# Patient Record
Sex: MALE | Race: WHITE | Employment: FULL TIME | URBAN - METROPOLITAN AREA
[De-identification: names, ages, dates, MRNs, and addresses within clinical notes are randomized per-mention and may not be internally consistent; named-entity substitution may affect disease eponyms.]

---

## 2024-04-27 ENCOUNTER — OFFICE VISIT (OUTPATIENT)
Dept: URGENT CARE | Facility: CLINIC | Age: 64
End: 2024-04-27
Payer: COMMERCIAL

## 2024-04-27 VITALS
DIASTOLIC BLOOD PRESSURE: 64 MMHG | OXYGEN SATURATION: 98 % | HEIGHT: 70 IN | HEART RATE: 66 BPM | SYSTOLIC BLOOD PRESSURE: 118 MMHG | TEMPERATURE: 97.7 F | RESPIRATION RATE: 16 BRPM | BODY MASS INDEX: 28.63 KG/M2 | WEIGHT: 200 LBS

## 2024-04-27 DIAGNOSIS — B34.9 VIRAL SYNDROME: Primary | ICD-10-CM

## 2024-04-27 PROCEDURE — 99213 OFFICE O/P EST LOW 20 MIN: CPT

## 2024-04-27 RX ORDER — ROSUVASTATIN CALCIUM 40 MG/1
40 TABLET, COATED ORAL DAILY
COMMUNITY
Start: 2024-04-17

## 2024-04-27 NOTE — PROGRESS NOTES
[facility]        NAME: Wilson Bower is a 64 y.o. male  : 1960    MRN: [id number]  DATE: 2024  TIME: 12:07 PM    Assessment and Plan   Viral syndrome [B34.9]  1. Viral syndrome              Patient Instructions   Saline nasal spray as often as needed in each nostril  Flonase nasal spray 1 spray in each nostril daily  Mucinex as directed  Tylenol or Motrin for fever pain    Follow up with PCP in 3-5 days.  Proceed to  ER if symptoms worsen.    If tests have been performed at [facility], our office will contact you with results if changes need to be made to the care plan discussed with you at the visit.  You can review your full results on [facility].    Chief Complaint     Chief Complaint   Patient presents with    Cold Like Symptoms     Pt reports cold like symptoms with sinus congestion/pressure and productive cough with green mucus. States onset of symptoms Monday with sneezing. Reports increase mucus production since. Not managing with otc medication. Unsure of any fever.          History of Present Illness       o this is a 64-year-old male who presents today with congestion sneezing runny nose postnasal drip for a week.  He denies any fever or chills denies any seasonal allergies.  He states he has been drinking orange juice for his symptoms at home.  His wife is with him to make sure that he listens send tells all of his symptoms        Review of Systems   Review of Systems   Constitutional:  Negative for chills, fatigue and fever.   HENT:  Positive for congestion, postnasal drip and rhinorrhea. Negative for ear discharge, ear pain, sinus pressure, sinus pain and sore throat.    Eyes:  Negative for pain and discharge.   Respiratory:  Positive for cough. Negative for shortness of breath.    Cardiovascular:  Negative for chest pain and palpitations.   Gastrointestinal:  Negative for abdominal pain, diarrhea, nausea and vomiting.   Genitourinary:  Negative for difficulty  "urinating and dysuria.   Musculoskeletal:  Negative for arthralgias and myalgias.   Skin:  Negative for rash.   Neurological:  Negative for dizziness, syncope, light-headedness, numbness and headaches.   Psychiatric/Behavioral:  Negative for agitation.    All other systems reviewed and are negative.        Current Medications       Current Outpatient Medications:     amLODIPine (NORVASC) 2.5 mg tablet, Take 2.5 mg by mouth daily, Disp: , Rfl:     lisinopril (ZESTRIL) 20 mg tablet, Take 20 mg by mouth daily, Disp: , Rfl:     metFORMIN (GLUCOPHAGE) 500 mg tablet, Take 500 mg by mouth 2 (two) times a day, Disp: , Rfl:     rosuvastatin (CRESTOR) 40 MG tablet, Take 40 mg by mouth daily, Disp: , Rfl:     Fluticasone Propionate (Xhance) 93 MCG/ACT EXHU, 1 puff into each nostril 2 (two) times a day (Patient not taking: Reported on 4/27/2024), Disp: 16 mL, Rfl: 11    Current Allergies     Allergies as of 04/27/2024    (No Known Allergies)            The following portions of the patient's history were reviewed and updated as appropriate: allergies, current medications, past family history, past medical history, past social history, past surgical history and problem list.     History reviewed. No pertinent past medical history.    History reviewed. No pertinent surgical history.    History reviewed. No pertinent family history.      Medications have been verified.        Objective   /64 (BP Location: Left arm, Patient Position: Sitting)   Pulse 66   Temp 97.7 °F (36.5 °C)   Resp 16   Ht 5' 10\" (1.778 m)   Wt 90.7 kg (200 lb)   SpO2 98%   BMI 28.70 kg/m²   No LMP for male patient.       Physical Exam     Physical Exam  Vitals reviewed.   Constitutional:       General: He is not in acute distress.     Appearance: Normal appearance. He is obese. He is not ill-appearing.   HENT:      Head: Normocephalic and atraumatic.      Right Ear: Tympanic membrane, ear canal and external ear normal.      Left Ear: Tympanic " membrane, ear canal and external ear normal.      Nose: Congestion and rhinorrhea present.      Mouth/Throat:      Mouth: Mucous membranes are moist.      Pharynx: Oropharynx is clear.   Eyes:      Extraocular Movements: Extraocular movements intact.      Conjunctiva/sclera: Conjunctivae normal.   Cardiovascular:      Rate and Rhythm: Normal rate and regular rhythm.      Pulses: Normal pulses.      Heart sounds: Normal heart sounds.   Pulmonary:      Effort: Pulmonary effort is normal.      Breath sounds: Normal breath sounds.   Abdominal:      General: Abdomen is flat. Bowel sounds are normal.   Musculoskeletal:         General: Normal range of motion.      Cervical back: Normal range of motion and neck supple. No tenderness.   Lymphadenopathy:      Cervical: No cervical adenopathy.   Skin:     General: Skin is warm.   Neurological:      General: No focal deficit present.      Mental Status: He is alert.   Psychiatric:         Mood and Affect: Mood normal.         Behavior: Behavior normal.         Judgment: Judgment normal.

## 2024-04-27 NOTE — PATIENT INSTRUCTIONS
Saline nasal spray as often as needed in each nostril  Flonase nasal spray 1 spray in each nostril daily  Mucinex as directed  Tylenol or Motrin for fever pain

## 2024-12-11 ENCOUNTER — APPOINTMENT (OUTPATIENT)
Dept: URGENT CARE | Facility: CLINIC | Age: 64
End: 2024-12-11